# Patient Record
Sex: FEMALE | Race: ASIAN | HISPANIC OR LATINO
[De-identification: names, ages, dates, MRNs, and addresses within clinical notes are randomized per-mention and may not be internally consistent; named-entity substitution may affect disease eponyms.]

---
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
## 2019-05-09 ENCOUNTER — TRANSCRIPTION ENCOUNTER (OUTPATIENT)
Age: 19
End: 2019-05-09

## 2021-08-09 ENCOUNTER — TRANSCRIPTION ENCOUNTER (OUTPATIENT)
Age: 21
End: 2021-08-09

## 2023-03-05 ENCOUNTER — NON-APPOINTMENT (OUTPATIENT)
Age: 23
End: 2023-03-05

## 2023-04-12 ENCOUNTER — APPOINTMENT (OUTPATIENT)
Dept: GASTROENTEROLOGY | Facility: CLINIC | Age: 23
End: 2023-04-12
Payer: COMMERCIAL

## 2023-04-12 ENCOUNTER — NON-APPOINTMENT (OUTPATIENT)
Age: 23
End: 2023-04-12

## 2023-04-12 VITALS
HEIGHT: 60 IN | BODY MASS INDEX: 21.2 KG/M2 | TEMPERATURE: 97.8 F | HEART RATE: 87 BPM | SYSTOLIC BLOOD PRESSURE: 118 MMHG | OXYGEN SATURATION: 98 % | DIASTOLIC BLOOD PRESSURE: 78 MMHG | WEIGHT: 108 LBS

## 2023-04-12 DIAGNOSIS — L29.0 PRURITUS ANI: ICD-10-CM

## 2023-04-12 PROCEDURE — 99203 OFFICE O/P NEW LOW 30 MIN: CPT

## 2023-04-16 PROBLEM — L29.0 ANAL PRURITUS: Status: ACTIVE | Noted: 2023-04-16

## 2023-04-16 NOTE — HISTORY OF PRESENT ILLNESS
[FreeTextEntry1] : 22F, here with anal pruritis x 1 year \par \par Episodic symptoms, no clear triggers. \par Sometimes wakes up from sleep with itching. \par Tried preparation H, hydrocortisone cream, and other anti itch OTC creams but they did not help. \par Went to urgent care, was rx'd treamtent for pinworms. Symptoms are now less frequent but still occur. \par \par Pruritis is worse after having a bowel movement. \par Typically has normal bowel movements. \par No straining \par Denies rectal pain, constipation, hematocehzia, melena, abd pain, weight loss. \par \par Does not take any medications \par \par No hx of STDs \par \par No prior egd/colon \par No fhx of GI malignancy or IBD \par \par \par

## 2023-04-16 NOTE — PHYSICAL EXAM
[Normal] : the sclera and conjunctiva were normal [Abdomen Soft] : soft [Abdomen Tenderness] : non-tender [de-identified] : external skin without excoriations or skin changes, no hemorrhoids, no stool or blood in rectal vault

## 2023-04-16 NOTE — ASSESSMENT
[FreeTextEntry1] : \par 22F, here with anal pruritis x 1 yr. Some mild improvement after treatment for pinworms. Worse after bowel movement. No other GI symptoms. No prior egd/colon. No fhx of GI malignancy or IBD. \par \par - discussed hygiene after a bowel movement -- recommend washing or using a pre moistened cloth with dabbing motions\par - cotton undergarments \par - trial of calmol 4 suppositories \par - can try barrier creams like desitin \par - bowel regimen if any constipation\par - discussed limiting potential dietary triggers - coffee, cola, tomatoes, chocolate, tea, citrus, beer \par - refer to colorectal surgery for anoscopy - name / numbers given \par \par RTC after colorectal surgery eval \par

## 2023-08-25 ENCOUNTER — NON-APPOINTMENT (OUTPATIENT)
Age: 23
End: 2023-08-25

## 2023-10-22 ENCOUNTER — NON-APPOINTMENT (OUTPATIENT)
Age: 23
End: 2023-10-22

## 2023-10-24 ENCOUNTER — NON-APPOINTMENT (OUTPATIENT)
Age: 23
End: 2023-10-24

## 2023-10-25 ENCOUNTER — APPOINTMENT (OUTPATIENT)
Dept: ORTHOPEDIC SURGERY | Facility: CLINIC | Age: 23
End: 2023-10-25
Payer: COMMERCIAL

## 2023-10-25 VITALS
HEIGHT: 60 IN | BODY MASS INDEX: 21.6 KG/M2 | HEART RATE: 101 BPM | SYSTOLIC BLOOD PRESSURE: 136 MMHG | DIASTOLIC BLOOD PRESSURE: 95 MMHG | WEIGHT: 110 LBS

## 2023-10-25 DIAGNOSIS — S62.665D NONDISPLACED FRACTURE OF DISTAL PHALANX OF LEFT RING FINGER, SUBSEQUENT ENCOUNTER FOR FRACTURE WITH ROUTINE HEALING: ICD-10-CM

## 2023-10-25 DIAGNOSIS — S62.639A DISPLACED FRACTURE OF DISTAL PHALANX OF UNSPECIFIED FINGER, INITIAL ENCOUNTER FOR CLOSED FRACTURE: ICD-10-CM

## 2023-10-25 PROCEDURE — 99203 OFFICE O/P NEW LOW 30 MIN: CPT

## 2024-12-27 ENCOUNTER — NON-APPOINTMENT (OUTPATIENT)
Age: 24
End: 2024-12-27